# Patient Record
Sex: MALE
[De-identification: names, ages, dates, MRNs, and addresses within clinical notes are randomized per-mention and may not be internally consistent; named-entity substitution may affect disease eponyms.]

---

## 2017-05-26 ENCOUNTER — TRANSCRIPTION ENCOUNTER (OUTPATIENT)
Age: 37
End: 2017-05-26

## 2018-10-23 ENCOUNTER — TRANSCRIPTION ENCOUNTER (OUTPATIENT)
Age: 38
End: 2018-10-23

## 2020-02-18 ENCOUNTER — TRANSCRIPTION ENCOUNTER (OUTPATIENT)
Age: 40
End: 2020-02-18

## 2020-03-26 ENCOUNTER — APPOINTMENT (OUTPATIENT)
Dept: CARDIOLOGY | Facility: CLINIC | Age: 40
End: 2020-03-26

## 2020-05-12 ENCOUNTER — APPOINTMENT (OUTPATIENT)
Dept: CARDIOLOGY | Facility: CLINIC | Age: 40
End: 2020-05-12
Payer: COMMERCIAL

## 2020-05-12 VITALS — BODY MASS INDEX: 28.62 KG/M2 | WEIGHT: 235 LBS | HEIGHT: 76 IN

## 2020-05-12 VITALS — SYSTOLIC BLOOD PRESSURE: 140 MMHG | DIASTOLIC BLOOD PRESSURE: 90 MMHG

## 2020-05-12 PROCEDURE — 99203 OFFICE O/P NEW LOW 30 MIN: CPT | Mod: 95

## 2020-05-12 RX ORDER — LAMOTRIGINE 25 MG/1
TABLET ORAL
Refills: 0 | Status: ACTIVE | COMMUNITY

## 2020-05-12 NOTE — HISTORY OF PRESENT ILLNESS
[FreeTextEntry1] : Hardeep Rodriguez presented to the office today for a cardiovascular evaluation. I saw him in 2016.\par \par He is now 35 years old, with a history of reflux. Over a period of time, he reported having experienced intermittent palpitations. He presented to the hospital in 2015 with atrial fibrillation. He did not convert back to sinus following ibutilide administration. I treated him with aspirin and metoprolol succinate 25 mg daily. I discharged him, expecting him to convert spontaneously back to sinus rhythm within 12 hours. As it turns out, about an hour after discharge from the hospital, he noted that he was back in sinus rhythm.\par \par He saw me in the office in followup 2 weeks later, and was doing well.\par \par He presented to the office virtually today with several concerns. He reports that he has had increasingly frequent episodes of atrial fibrillation. However, he has not had any documentation of these episodes. He describes these episodes as a sense of a regular pulse, followed by an extra beat, likely a premature atrial or ventricular beat. He will have weeks at a time where they are more frequent, with periods of time when they're much less frequent. He will feel a sense of discomfort in his chest with the premature beats, at least intermittently.\par \par He continues to smoke. He does not exercise much, but is able to exercise with walking, without difficulty. He denies orthopnea, PND and edema. He denies dizziness and syncope.

## 2020-05-12 NOTE — REASON FOR VISIT
[Home] : at home, [unfilled] , at the time of the visit. [Patient] : the patient [Self] : self [Medical Office: ([facility])___] : at the medical office located in  [FreeTextEntry1] : initiated @12:30 pm [FreeTextEntry2] : Hardeep Rodriguez [FreeTextEntry4] : Susu MONTOYA

## 2020-05-12 NOTE — DISCUSSION/SUMMARY
[FreeTextEntry1] : Hardeep had paroxysmal atrial fibrillation in the past. His description it does not sound like atrial fibrillation, but rather premature beats. I suggested another echocardiogram, as well as an event monitor. I will be in contact with him to discuss the results.

## 2020-05-12 NOTE — PHYSICAL EXAM
[Oriented To Time, Place, And Person] : oriented to person, place, and time [Affect] : the affect was normal [Mood] : the mood was normal [No Anxiety] : not feeling anxious

## 2020-06-23 ENCOUNTER — APPOINTMENT (OUTPATIENT)
Dept: CARDIOLOGY | Facility: CLINIC | Age: 40
End: 2020-06-23
Payer: COMMERCIAL

## 2020-06-23 PROCEDURE — 93306 TTE W/DOPPLER COMPLETE: CPT

## 2020-08-28 ENCOUNTER — NON-APPOINTMENT (OUTPATIENT)
Age: 40
End: 2020-08-28

## 2020-08-28 ENCOUNTER — APPOINTMENT (OUTPATIENT)
Dept: CARDIOLOGY | Facility: CLINIC | Age: 40
End: 2020-08-28
Payer: COMMERCIAL

## 2020-08-28 VITALS
OXYGEN SATURATION: 95 % | WEIGHT: 240 LBS | HEIGHT: 76 IN | DIASTOLIC BLOOD PRESSURE: 84 MMHG | SYSTOLIC BLOOD PRESSURE: 131 MMHG | HEART RATE: 86 BPM | BODY MASS INDEX: 29.22 KG/M2

## 2020-08-28 PROCEDURE — 93000 ELECTROCARDIOGRAM COMPLETE: CPT

## 2020-08-28 PROCEDURE — 99214 OFFICE O/P EST MOD 30 MIN: CPT

## 2020-08-28 RX ORDER — BUPROPION HYDROCHLORIDE 75 MG/1
75 TABLET, FILM COATED ORAL
Refills: 0 | Status: ACTIVE | COMMUNITY

## 2020-08-28 NOTE — DISCUSSION/SUMMARY
[FreeTextEntry1] : Hardeep had paroxysmal atrial fibrillation in the past. His description it does not sound like atrial fibrillation, but rather premature beats. \par \par This fits with my examination findings. I think a Holter monitor is reasonable. I will be in contact with him to discuss the results.

## 2020-08-28 NOTE — PHYSICAL EXAM
[General Appearance - Well Developed] : well developed [Normal Appearance] : normal appearance [Well Groomed] : well groomed [General Appearance - Well Nourished] : well nourished [No Deformities] : no deformities [General Appearance - In No Acute Distress] : no acute distress [Normal Conjunctiva] : the conjunctiva exhibited no abnormalities [Affect] : the affect was normal [Oriented To Time, Place, And Person] : oriented to person, place, and time [Eyelids - No Xanthelasma] : the eyelids demonstrated no xanthelasmas [No Anxiety] : not feeling anxious [Mood] : the mood was normal [Normal Oral Mucosa] : normal oral mucosa [No Oral Pallor] : no oral pallor [No Oral Cyanosis] : no oral cyanosis [Normal Jugular Venous A Waves Present] : normal jugular venous A waves present [Normal Jugular Venous V Waves Present] : normal jugular venous V waves present [No Jugular Venous Gunter A Waves] : no jugular venous gunter A waves [Respiration, Rhythm And Depth] : normal respiratory rhythm and effort [Exaggerated Use Of Accessory Muscles For Inspiration] : no accessory muscle use [Auscultation Breath Sounds / Voice Sounds] : lungs were clear to auscultation bilaterally [Normal Rate] : normal [Premature Beats] : regular with premature beats [Normal S1] : normal S1 [Normal S2] : normal S2 [S3] : no S3 [S4] : no S4 [No Murmur] : no murmurs heard [Right Carotid Bruit] : no bruit heard over the right carotid [Left Carotid Bruit] : no bruit heard over the left carotid [Right Femoral Bruit] : no bruit heard over the right femoral artery [Left Femoral Bruit] : no bruit heard over the left femoral artery [2+] : left 2+ [No Abnormalities] : the abdominal aorta was not enlarged and no bruit was heard [No Pitting Edema] : no pitting edema present [Abdomen Soft] : soft [Abdomen Tenderness] : non-tender [Abdomen Mass (___ Cm)] : no abdominal mass palpated [Abnormal Walk] : normal gait [Gait - Sufficient For Exercise Testing] : the gait was sufficient for exercise testing [Nail Clubbing] : no clubbing of the fingernails [Cyanosis, Localized] : no localized cyanosis [Petechial Hemorrhages (___cm)] : no petechial hemorrhages [Skin Color & Pigmentation] : normal skin color and pigmentation [] : no rash [No Venous Stasis] : no venous stasis [Skin Lesions] : no skin lesions [No Skin Ulcers] : no skin ulcer [No Xanthoma] : no  xanthoma was observed

## 2020-09-01 ENCOUNTER — APPOINTMENT (OUTPATIENT)
Dept: CARDIOLOGY | Facility: CLINIC | Age: 40
End: 2020-09-01
Payer: COMMERCIAL

## 2020-09-13 ENCOUNTER — TRANSCRIPTION ENCOUNTER (OUTPATIENT)
Age: 40
End: 2020-09-13

## 2020-09-17 PROCEDURE — 93224 XTRNL ECG REC UP TO 48 HRS: CPT

## 2021-11-16 ENCOUNTER — TRANSCRIPTION ENCOUNTER (OUTPATIENT)
Age: 41
End: 2021-11-16

## 2024-04-25 ENCOUNTER — NON-APPOINTMENT (OUTPATIENT)
Age: 44
End: 2024-04-25